# Patient Record
Sex: MALE | Race: AMERICAN INDIAN OR ALASKA NATIVE
[De-identification: names, ages, dates, MRNs, and addresses within clinical notes are randomized per-mention and may not be internally consistent; named-entity substitution may affect disease eponyms.]

---

## 2021-01-05 ENCOUNTER — HOSPITAL ENCOUNTER (OUTPATIENT)
Dept: HOSPITAL 5 - OR | Age: 36
Discharge: HOME | End: 2021-01-05
Attending: SURGERY
Payer: COMMERCIAL

## 2021-01-05 VITALS — SYSTOLIC BLOOD PRESSURE: 126 MMHG | DIASTOLIC BLOOD PRESSURE: 86 MMHG

## 2021-01-05 DIAGNOSIS — Z79.899: ICD-10-CM

## 2021-01-05 DIAGNOSIS — R22.0: Primary | ICD-10-CM

## 2021-01-05 DIAGNOSIS — Z98.890: ICD-10-CM

## 2021-01-05 DIAGNOSIS — D17.0: ICD-10-CM

## 2021-01-05 PROCEDURE — U0003 INFECTIOUS AGENT DETECTION BY NUCLEIC ACID (DNA OR RNA); SEVERE ACUTE RESPIRATORY SYNDROME CORONAVIRUS 2 (SARS-COV-2) (CORONAVIRUS DISEASE [COVID-19]), AMPLIFIED PROBE TECHNIQUE, MAKING USE OF HIGH THROUGHPUT TECHNOLOGIES AS DESCRIBED BY CMS-2020-01-R: HCPCS

## 2021-01-05 PROCEDURE — 26115 EXC HAND LES SC < 1.5 CM: CPT

## 2021-01-05 PROCEDURE — 88307 TISSUE EXAM BY PATHOLOGIST: CPT

## 2021-01-05 NOTE — PROCEDURE NOTE
Date of procedure: 01/05/21


Pre-op diagnosis: soft tissue mass forehead


Post-op diagnosis: same


Procedure: 





excision soft tissue mass of forehead


Findings: 


Patient positioned on stretcher in supine position.  Left forehead in the area 

of the soft tissue mass prepped and draped in the usual sterile fashion.  Time 

out performed.  Local anesthetic was infiltrated to skin at the intended 

incision site.  A 2 cm transverse incision was made using a 15 blade.  Di

ssection was carried down through the skin and subcutaneous tissue using 

hemostat.  Hemostasis was achieved along the way with electrocautery.  The mass 

was encountered and dissected free from its capsule.  It was then 

circumferentially dissected from the surrounding tissue using hemostat, iris 

scissors, forceps, electrocautery.  The mass was freed from the scalp using iris

scissors and once completely dissected it was removed from the wound and 

measured at 2.5 cm.  It was passed off the table as a specimen.  The wound was 

then checked for hemostasis.  The wound was irrigated and hemostasis carefully 

achieved using a combination of electrocautery and pressure.  The incision was 

then closed.  The deep dermal layer was closed with 4-0 Vicryl interrupted 

suture.  The skin was approximated using interrupted 4-0 Monocryl subcuticular 

stitches and skin glue.





At the end of the case all sponge, instrument, sharp counts were correct x2.  

The patient tolerated the procedure well and was discharged home in stable 

condition.


Anesthesia: local


Surgeon: LISSETH REBOLLAR


Estimated blood loss: minimal


Pathology: list (soft tissue mass forehead)


Specimen disposition: to lab


Condition: stable


Disposition: other (home)